# Patient Record
Sex: MALE | Race: WHITE | NOT HISPANIC OR LATINO | URBAN - METROPOLITAN AREA
[De-identification: names, ages, dates, MRNs, and addresses within clinical notes are randomized per-mention and may not be internally consistent; named-entity substitution may affect disease eponyms.]

---

## 2018-07-20 ENCOUNTER — OFFICE VISIT (OUTPATIENT)
Dept: GASTROENTEROLOGY | Facility: CLINIC | Age: 33
End: 2018-07-20

## 2018-07-20 VITALS
DIASTOLIC BLOOD PRESSURE: 70 MMHG | SYSTOLIC BLOOD PRESSURE: 132 MMHG | BODY MASS INDEX: 23.99 KG/M2 | WEIGHT: 167.6 LBS | HEIGHT: 70 IN | TEMPERATURE: 97.7 F

## 2018-07-20 DIAGNOSIS — K59.1 FUNCTIONAL DIARRHEA: ICD-10-CM

## 2018-07-20 DIAGNOSIS — R14.0 ABDOMINAL BLOATING: ICD-10-CM

## 2018-07-20 DIAGNOSIS — K58.0 IRRITABLE BOWEL SYNDROME WITH DIARRHEA: ICD-10-CM

## 2018-07-20 DIAGNOSIS — R19.4 CHANGE IN BOWEL HABITS: ICD-10-CM

## 2018-07-20 DIAGNOSIS — R68.81 EARLY SATIETY: ICD-10-CM

## 2018-07-20 DIAGNOSIS — R11.0 NAUSEA: Primary | ICD-10-CM

## 2018-07-20 PROCEDURE — 99204 OFFICE O/P NEW MOD 45 MIN: CPT | Performed by: INTERNAL MEDICINE

## 2018-07-20 RX ORDER — BUPRENORPHINE HYDROCHLORIDE AND NALOXONE HYDROCHLORIDE DIHYDRATE 8; 2 MG/1; MG/1
TABLET SUBLINGUAL
Refills: 0 | COMMUNITY
Start: 2018-07-18

## 2018-07-20 RX ORDER — ESCITALOPRAM OXALATE 10 MG/1
10 TABLET ORAL
Refills: 0 | COMMUNITY
Start: 2018-07-11

## 2018-07-20 NOTE — PROGRESS NOTES
Chief Complaint   Patient presents with   • Bloated   • GI Problem     changes in stools, loose and mucous        Lalo Merlos is a 33 y.o. male who presents with Change in bowel habits, mucus in the stool, diarrhea and abdominal bloating    GI Problem   The primary symptoms include fever, weight loss, fatigue, abdominal pain, nausea, vomiting, diarrhea, melena and rash. Primary symptoms do not include hematochezia or myalgias. The illness began more than 7 days ago. The onset was gradual. The problem has been gradually worsening.   The fatigue has been worsening since its onset. The fatigue is worsened by emotional stress, exertion and stress.   The illness is also significant for chills, anorexia, bloating and back pain. Significant associated medical issues include irritable bowel syndrome. Associated medical issues do not include inflammatory bowel disease, GERD, gallstones, PUD, bowel resection, hemorrhoids or diverticulitis.       History reviewed. No pertinent past medical history.    Past Surgical History:   Procedure Laterality Date   • EXTERNAL EAR SURGERY           Current Outpatient Prescriptions:   •  Chlorpheniramine Maleate (ALLERGY PO), Take  by mouth., Disp: , Rfl:   •  buprenorphine-naloxone (SUBOXONE) 8-2 MG per SL tablet, take 1.5 under the tongue once daily, Disp: , Rfl: 0  •  escitalopram (LEXAPRO) 10 MG tablet, Take 10 mg by mouth every night at bedtime., Disp: , Rfl: 0    No Known Allergies    Social History     Social History   • Marital status: Single     Spouse name: N/A   • Number of children: N/A   • Years of education: N/A     Occupational History   • Not on file.     Social History Main Topics   • Smoking status: Never Smoker   • Smokeless tobacco: Current User     Types: Chew   • Alcohol use Yes   • Drug use: Yes     Types: Marijuana   • Sexual activity: Not on file     Other Topics Concern   • Not on file     Social History Narrative   • No narrative on file       Family History    Problem Relation Age of Onset   • Ulcers Maternal Uncle        Review of Systems   Constitutional: Positive for chills, fatigue, fever and weight loss.   Gastrointestinal: Positive for abdominal pain, anorexia, bloating, diarrhea, melena, nausea and vomiting. Negative for hematochezia.   Musculoskeletal: Positive for back pain. Negative for myalgias.   Skin: Positive for rash.   All other systems reviewed and are negative.      Vitals:    07/20/18 1114   BP: 132/70   Temp: 97.7 °F (36.5 °C)       Physical Exam   Constitutional: He is oriented to person, place, and time. He appears well-developed and well-nourished.   HENT:   Head: Normocephalic and atraumatic.   Eyes: Conjunctivae and EOM are normal.   Neck: Normal range of motion. No tracheal deviation present.   Cardiovascular: Normal rate and regular rhythm.    Pulmonary/Chest: Effort normal and breath sounds normal. No respiratory distress.   Abdominal: Soft. Bowel sounds are normal. He exhibits no distension and no mass. There is no tenderness. There is no rebound and no guarding.   Musculoskeletal: Normal range of motion.   Neurological: He is alert and oriented to person, place, and time.   Skin: Skin is warm and dry.   Psychiatric: He has a normal mood and affect. Judgment normal.   Nursing note and vitals reviewed.    Problem list    Change in bowel habits  Mucus in the stools  Abdominal bloating  Diarrhea    Assessment/Plan    His symptoms sound functional in nature, probably all IBS-D with associated depression and fatigue  He has started an SSRI by his primary care doctor  I would perform EGD and colonoscopy for the above symptoms to rule out an organic cause, if there is no organic cause found we will treat if not I would move to [facility]  IBS-D can also be treated with IBGARD as needed

## 2018-08-01 ENCOUNTER — OUTSIDE FACILITY SERVICE (OUTPATIENT)
Dept: GASTROENTEROLOGY | Facility: CLINIC | Age: 33
End: 2018-08-01

## 2018-08-01 PROCEDURE — 45380 COLONOSCOPY AND BIOPSY: CPT | Performed by: INTERNAL MEDICINE

## 2018-08-01 PROCEDURE — 43239 EGD BIOPSY SINGLE/MULTIPLE: CPT | Performed by: INTERNAL MEDICINE

## 2018-08-02 ENCOUNTER — TELEPHONE (OUTPATIENT)
Dept: GASTROENTEROLOGY | Facility: CLINIC | Age: 33
End: 2018-08-02

## 2018-08-02 NOTE — TELEPHONE ENCOUNTER
"Per Dr Moser: \"Start now,  coupon card, his path will be neg\"     Pt has [employer] insurance. He cannot use the savings card unfortunately. Will call in script to pharmacy and will have Nadia work PA if needed.     Called pt and advised can send his script for Xifaxan in to the pharmacy for him, but need to know what pharmacy he uses as there is not a pharmacy on file. Pt verb understanding and uses [facility] in [place]. Med e-scribed.   "

## 2018-08-02 NOTE — TELEPHONE ENCOUNTER
----- Message from Cleo Martinez sent at 8/2/2018 10:31 AM EDT -----  Regarding: medication   Contact: [phone]  Pt is calling stating he had a scope done yesterday &  was suppose to call in xifaxan for the pt to take for 14 days but their wasn't nothing at his pharm ?? Pt would liek a call back

## 2018-08-03 ENCOUNTER — PRIOR AUTHORIZATION (OUTPATIENT)
Dept: GASTROENTEROLOGY | Facility: CLINIC | Age: 33
End: 2018-08-03

## 2018-08-03 NOTE — TELEPHONE ENCOUNTER
PA submitted over the phone with [employer] ([phone]). Case number [id number]. Decision will be faxed within 72 hours.

## 2018-08-07 NOTE — TELEPHONE ENCOUNTER
"PA denied for Xifaxan. Letter scanned into Media tab. Letter says it \"does not meet criteria for coverage.\"  "

## 2018-08-10 NOTE — TELEPHONE ENCOUNTER
"Per Dr Moser: \"Alinia samples, give him the samples, 20 pills, one by mouth twice a day for 10 days   Tell him its  just as good as I know he is a bit anxious\"     Alinia samples collected for pt; they are at my desk.     Called pt.... No answer after multiple rings; left a message for call back.   "

## 2018-08-13 NOTE — TELEPHONE ENCOUNTER
Pt returned call per a staff message.    Called pt back.... No answer after multiple rings; left message that we spoke with Dr Moser about his options since his insurance denied the request for Xifaxan and MD wants to change the prescription to a medication called Alinia. Advised we are going to provide samples to him for this script and he can  the samples from the  of the office from 8AM to 5 PM Mon- Fri. Advised he will take 1 table by mouth twice daily and the samples cover the entire script. Advised will leave the samples at the  for him to  at his convenience. Pt to call back with any questions.

## 2018-08-22 ENCOUNTER — TELEPHONE (OUTPATIENT)
Dept: GASTROENTEROLOGY | Facility: CLINIC | Age: 33
End: 2018-08-22

## 2018-08-22 NOTE — TELEPHONE ENCOUNTER
----- Message from Cameron Moser MD sent at 8/7/2018 12:42 PM EDT -----  His biopsies did not reveal microscopic colitis, Crohn's disease or ulcerative colitis.  He has IBS–D.  Continue Xifaxan.  Office with me in 6 weeks to discuss symptoms and contemplate IB cause testing.

## 2018-08-22 NOTE — TELEPHONE ENCOUNTER
Patient called, no answer, left message(voicemail identifies patient), advised as per Dr. Moser's note. Advised to call back to call back to schedule f/u office visit and or for questions.

## 2024-04-16 ENCOUNTER — OFFICE VISIT (OUTPATIENT)
Dept: GASTROENTEROLOGY | Facility: CLINIC | Age: 39
End: 2024-04-16
Payer: COMMERCIAL

## 2024-04-16 VITALS
SYSTOLIC BLOOD PRESSURE: 112 MMHG | BODY MASS INDEX: 25.91 KG/M2 | WEIGHT: 181 LBS | DIASTOLIC BLOOD PRESSURE: 80 MMHG | HEIGHT: 70 IN

## 2024-04-16 DIAGNOSIS — R10.13 DYSPEPSIA: Primary | ICD-10-CM

## 2024-04-16 DIAGNOSIS — K58.1 IRRITABLE BOWEL SYNDROME WITH CONSTIPATION: ICD-10-CM

## 2024-04-16 RX ORDER — DEXTROAMPHETAMINE SACCHARATE, AMPHETAMINE ASPARTATE, DEXTROAMPHETAMINE SULFATE AND AMPHETAMINE SULFATE 5; 5; 5; 5 MG/1; MG/1; MG/1; MG/1
20 TABLET ORAL 3 TIMES DAILY
COMMUNITY

## 2024-04-16 NOTE — ASSESSMENT & PLAN NOTE
- Declined starting any new medications or further testing other than for parasites.   - GI PCR panel and stool culture ordered

## 2024-04-16 NOTE — PROGRESS NOTES
Lalo Merlos is a 38 y.o. male with a past medical history noted below who presents for evaluation of   Chief Complaint   Patient presents with    Abdominal Pain    Diarrhea    Constipation       Subjective     # Dyspepsia   # IBS-C   - Reports constipation ongoing for at least 2 years further described as having a BM every 3 to 4 days.   - Also reports epigastric bloating.   - Noted ID referral placed by PCP given patient's concern for parasites. Patient is concerned he has a parasite and was disappointed when he realized this encounter was not with Infectious Disease.   - Denies NSAID use.               No past medical history on file.      Current Outpatient Medications:     amphetamine-dextroamphetamine (ADDERALL) 20 MG tablet, Take 1 tablet by mouth 3 (Three) Times a Day., Disp: , Rfl:     buprenorphine-naloxone (SUBOXONE) 8-2 MG per SL tablet, take 1.5 under the tongue once daily, Disp: , Rfl: 0    No Known Allergies    Social History     Socioeconomic History    Marital status: Single   Tobacco Use    Smoking status: Never     Passive exposure: Never    Smokeless tobacco: Former     Types: Chew   Vaping Use    Vaping status: Never Used   Substance and Sexual Activity    Alcohol use: Yes    Drug use: Yes     Types: Marijuana    Sexual activity: Defer       Family History   Problem Relation Age of Onset    Ulcers Maternal Uncle        Objective     Vitals:    04/16/24 1119   BP: 112/80         04/16/24  1119   Weight: 82.1 kg (181 lb)     Body mass index is 25.97 kg/m².    Physical Exam  Vitals reviewed.   Constitutional:       Appearance: Normal appearance.   HENT:      Head: Normocephalic and atraumatic.   Eyes:      Extraocular Movements: Extraocular movements intact.      Conjunctiva/sclera: Conjunctivae normal.   Cardiovascular:      Rate and Rhythm: Normal rate and regular rhythm.      Heart sounds: Normal heart sounds.   Pulmonary:      Effort: Pulmonary effort is normal.      Breath sounds: Normal  "breath sounds.   Abdominal:      General: Abdomen is flat. Bowel sounds are normal. There is no distension.      Palpations: Abdomen is soft.      Tenderness: There is no abdominal tenderness.   Neurological:      Mental Status: He is alert.   Psychiatric:         Mood and Affect: Mood normal.         Behavior: Behavior normal.         No results found for: \"WBC\", \"RBC\", \"HGB\", \"HCT\", \"MCV\", \"MCH\", \"MCHC\", \"RDW\", \"RDWSD\", \"MPV\", \"PLT\", \"NEUTRORELPCT\", \"LYMPHORELPCT\", \"MONORELPCT\", \"EOSRELPCT\", \"BASORELPCT\", \"AUTOIGPER\", \"NEUTROABS\", \"LYMPHSABS\", \"MONOSABS\", \"EOSABS\", \"BASOSABS\", \"AUTOIGNUM\", \"NRBC\"    No results found for: \"GLUCOSE\", \"NA\", \"K\", \"CO2\", \"CL\", \"ANIONGAP\", \"CREATININE\", \"BUN\", \"BCR\", \"CALCIUM\", \"EGFRIFNONA\", \"ALKPHOS\", \"PROTEINTOT\", \"ALT\", \"AST\", \"BILITOT\", \"ALBUMIN\", \"GLOB\", \"LABIL2\"      Imaging Results (Last 7 Days)       ** No results found for the last 168 hours. **                   Assessment & Plan     Diagnoses and all orders for this visit:    1. Dyspepsia (Primary)  Assessment & Plan:  - Declined starting any new medications or further testing other than for parasites.   - GI PCR panel and stool culture ordered     Orders:  -     Stool Culture With Yersinia - Stool, Per Rectum; Future  -     Gastrointestinal Panel, PCR - Stool, Per Rectum; Future    2. Irritable bowel syndrome with constipation  Assessment & Plan:  - Declined starting any new medications or further testing other than for parasites.   - Recommend OTC daily Metamucil and daily Miralax         F/u as needed as patient did not desire to have f/u    I have discussed the above plan with the patient.  They verbalize understanding and are in agreement with the plan.  They have been advised to contact the office for any questions, concerns, or changes related to their health.        "

## 2024-04-17 PROBLEM — K58.1 IRRITABLE BOWEL SYNDROME WITH CONSTIPATION: Status: ACTIVE | Noted: 2024-04-17

## 2024-04-17 NOTE — ASSESSMENT & PLAN NOTE
- Declined starting any new medications or further testing other than for parasites.   - Recommend OTC daily Metamucil and daily Miralax

## 2024-04-19 ENCOUNTER — LAB (OUTPATIENT)
Dept: LAB | Facility: HOSPITAL | Age: 39
End: 2024-04-19
Payer: COMMERCIAL

## 2024-04-19 DIAGNOSIS — R10.13 DYSPEPSIA: ICD-10-CM

## 2024-04-22 ENCOUNTER — LAB (OUTPATIENT)
Dept: LAB | Facility: HOSPITAL | Age: 39
End: 2024-04-22
Payer: COMMERCIAL

## 2024-04-22 DIAGNOSIS — R10.13 DYSPEPSIA: ICD-10-CM

## 2024-04-22 LAB

## 2024-04-22 PROCEDURE — 87507 IADNA-DNA/RNA PROBE TQ 12-25: CPT

## 2024-04-22 NOTE — PROGRESS NOTES
- GI PCR Panel obtained for dyspepsia which was negative.   - POC: Has referral already ordered for ID as previously requested. Stool culture pending. Elected to follow up as needed.